# Patient Record
Sex: FEMALE | Race: OTHER | URBAN - METROPOLITAN AREA
[De-identification: names, ages, dates, MRNs, and addresses within clinical notes are randomized per-mention and may not be internally consistent; named-entity substitution may affect disease eponyms.]

---

## 2019-01-24 ENCOUNTER — APPOINTMENT (OUTPATIENT)
Dept: URBAN - METROPOLITAN AREA CLINIC 290 | Age: 44
Setting detail: DERMATOLOGY
End: 2019-01-25

## 2019-01-24 DIAGNOSIS — Z41.9 ENCOUNTER FOR PROCEDURE FOR PURPOSES OTHER THAN REMEDYING HEALTH STATE, UNSPECIFIED: ICD-10-CM

## 2019-01-24 PROCEDURE — OTHER COSMETIC CONSULTATION: COOLSCULPTING: OTHER

## 2019-01-24 PROCEDURE — OTHER COSMETIC CONSULTATION: CHEMICAL PEELS: OTHER

## 2019-01-24 ASSESSMENT — LOCATION DETAILED DESCRIPTION DERM
LOCATION DETAILED: LEFT LATERAL ABDOMEN
LOCATION DETAILED: LEFT INFERIOR CENTRAL MALAR CHEEK

## 2019-01-24 ASSESSMENT — LOCATION SIMPLE DESCRIPTION DERM
LOCATION SIMPLE: LEFT CHEEK
LOCATION SIMPLE: ABDOMEN

## 2019-01-24 ASSESSMENT — LOCATION ZONE DERM
LOCATION ZONE: FACE
LOCATION ZONE: TRUNK

## 2020-01-16 ENCOUNTER — APPOINTMENT (OUTPATIENT)
Dept: URBAN - METROPOLITAN AREA CLINIC 290 | Age: 45
Setting detail: DERMATOLOGY
End: 2020-01-16

## 2020-01-16 DIAGNOSIS — L72.8 OTHER FOLLICULAR CYSTS OF THE SKIN AND SUBCUTANEOUS TISSUE: ICD-10-CM

## 2020-01-16 DIAGNOSIS — Z41.9 ENCOUNTER FOR PROCEDURE FOR PURPOSES OTHER THAN REMEDYING HEALTH STATE, UNSPECIFIED: ICD-10-CM

## 2020-01-16 PROBLEM — D48.5 NEOPLASM OF UNCERTAIN BEHAVIOR OF SKIN: Status: ACTIVE | Noted: 2020-01-16

## 2020-01-16 PROCEDURE — 99202 OFFICE O/P NEW SF 15 MIN: CPT

## 2020-01-16 PROCEDURE — OTHER BOTOX (U OR CC): OTHER

## 2020-01-16 PROCEDURE — OTHER TREATMENT REGIMEN: OTHER

## 2020-01-16 PROCEDURE — OTHER COUNSELING: OTHER

## 2020-01-16 PROCEDURE — OTHER PRESCRIPTION: OTHER

## 2020-01-16 PROCEDURE — OTHER MIPS QUALITY: OTHER

## 2020-01-16 RX ORDER — CLINDAMYCIN PHOSPHATE 10 MG/ML
LOTION TOPICAL
Qty: 60 | Refills: 1 | Status: ERX | COMMUNITY
Start: 2020-01-16

## 2020-01-16 ASSESSMENT — LOCATION ZONE DERM: LOCATION ZONE: AXILLAE

## 2020-01-16 ASSESSMENT — LOCATION DETAILED DESCRIPTION DERM: LOCATION DETAILED: RIGHT AXILLARY VAULT

## 2020-01-16 ASSESSMENT — LOCATION SIMPLE DESCRIPTION DERM: LOCATION SIMPLE: RIGHT AXILLARY VAULT

## 2020-01-16 NOTE — PROCEDURE: BOTOX (U OR CC)
Additional Area 1 Units: 20
Additional Area 2 Units: 0
Consent: Verbal consent obtained. Risks include but not limited to lid/brow ptosis, bruising, swelling, diplopia, temporary effect, incomplete chemical denervation.
Dilution (U/0.1 Cc): 4
Price (Use Numbers Only, No Special Characters Or $): 200
Document As Units Or Cc?: units
Additional Area 1 Location: [place]
Post-Care Instructions: Patient instructed to not lie down for 4 hours and limit physical activity for 24 hours. Patient instructed not to travel by airplane for 48 hours.
Detail Level: Zone
Including Pricing Information In The Note: No

## 2020-02-26 ENCOUNTER — APPOINTMENT (OUTPATIENT)
Dept: URBAN - METROPOLITAN AREA CLINIC 290 | Age: 45
Setting detail: DERMATOLOGY
End: 2020-03-07

## 2020-02-26 DIAGNOSIS — Z41.9 ENCOUNTER FOR PROCEDURE FOR PURPOSES OTHER THAN REMEDYING HEALTH STATE, UNSPECIFIED: ICD-10-CM

## 2020-02-26 PROCEDURE — OTHER COSMETIC CONSULTATION: FILLERS: OTHER

## 2020-02-26 PROCEDURE — OTHER BOTOX (U OR CC): OTHER

## 2020-02-26 NOTE — PROCEDURE: BOTOX (U OR CC)
Additional Area 4 Location: [facility] Additional Area 4 Location: [facility]

## 2020-06-08 ENCOUNTER — APPOINTMENT (OUTPATIENT)
Dept: URBAN - METROPOLITAN AREA CLINIC 283 | Age: 45
Setting detail: DERMATOLOGY
End: 2020-06-08

## 2020-06-08 DIAGNOSIS — Z41.9 ENCOUNTER FOR PROCEDURE FOR PURPOSES OTHER THAN REMEDYING HEALTH STATE, UNSPECIFIED: ICD-10-CM

## 2020-06-08 PROCEDURE — OTHER BOTOX (U OR CC): OTHER

## 2020-06-08 NOTE — PROCEDURE: BOTOX (U OR CC)
Price (Use Numbers Only, No Special Characters Or $): 300
Periorbital Skin Units/Cc: 0
Including Pricing Information In The Note: No
Glabellar Complex Units: 30
Dilution (U/0.1 Cc): 4
Document As Units Or Cc?: units
Consent: Verbal consent obtained. Risks include but not limited to lid/brow ptosis, bruising, swelling, diplopia, temporary effect, incomplete chemical denervation.
Detail Level: Zone
Post-Care Instructions: Patient instructed to not lie down for 4 hours and limit physical activity for 24 hours. Patient instructed not to travel by airplane for 48 hours.

## 2021-02-24 ENCOUNTER — APPOINTMENT (OUTPATIENT)
Dept: URBAN - METROPOLITAN AREA CLINIC 231 | Age: 46
Setting detail: DERMATOLOGY
End: 2021-02-24

## 2021-02-24 DIAGNOSIS — Z41.9 ENCOUNTER FOR PROCEDURE FOR PURPOSES OTHER THAN REMEDYING HEALTH STATE, UNSPECIFIED: ICD-10-CM

## 2021-02-24 PROCEDURE — OTHER BOTOX (U OR CC): OTHER

## 2021-02-24 NOTE — PROCEDURE: BOTOX (U OR CC)
Price (Use Numbers Only, No Special Characters Or $): 932 Price (Use Numbers Only, No Special Characters Or $): 740

## 2021-05-28 ENCOUNTER — APPOINTMENT (OUTPATIENT)
Dept: URBAN - METROPOLITAN AREA CLINIC 231 | Age: 46
Setting detail: DERMATOLOGY
End: 2021-05-28

## 2021-05-28 DIAGNOSIS — Z41.9 ENCOUNTER FOR PROCEDURE FOR PURPOSES OTHER THAN REMEDYING HEALTH STATE, UNSPECIFIED: ICD-10-CM

## 2021-05-28 DIAGNOSIS — L82.0 INFLAMED SEBORRHEIC KERATOSIS: ICD-10-CM

## 2021-05-28 PROCEDURE — OTHER LIQUID NITROGEN: OTHER

## 2021-05-28 PROCEDURE — 17110 DESTRUCT B9 LESION 1-14: CPT

## 2021-05-28 PROCEDURE — OTHER BOTOX (U OR CC): OTHER

## 2021-05-28 PROCEDURE — OTHER COUNSELING: OTHER

## 2021-05-28 ASSESSMENT — LOCATION DETAILED DESCRIPTION DERM
LOCATION DETAILED: LEFT SUPERIOR FOREHEAD
LOCATION DETAILED: LEFT INFERIOR FOREHEAD
LOCATION DETAILED: LEFT LATERAL FOREHEAD
LOCATION DETAILED: RIGHT LATERAL FOREHEAD
LOCATION DETAILED: GLABELLA
LOCATION DETAILED: LEFT INFERIOR MEDIAL FOREHEAD
LOCATION DETAILED: RIGHT SUPERIOR FOREHEAD
LOCATION DETAILED: RIGHT INFERIOR MEDIAL FOREHEAD
LOCATION DETAILED: RIGHT SUPERIOR MEDIAL FOREHEAD
LOCATION DETAILED: LEFT MEDIAL FOREHEAD
LOCATION DETAILED: NASAL DORSUM

## 2021-05-28 ASSESSMENT — LOCATION SIMPLE DESCRIPTION DERM
LOCATION SIMPLE: LEFT FOREHEAD
LOCATION SIMPLE: RIGHT FOREHEAD
LOCATION SIMPLE: GLABELLA
LOCATION SIMPLE: NOSE

## 2021-05-28 ASSESSMENT — LOCATION ZONE DERM
LOCATION ZONE: FACE
LOCATION ZONE: NOSE

## 2021-05-28 NOTE — PROCEDURE: BOTOX (U OR CC)
Price (Use Numbers Only, No Special Characters Or $): 478 Price (Use Numbers Only, No Special Characters Or $): 441

## 2021-11-10 ENCOUNTER — APPOINTMENT (OUTPATIENT)
Dept: URBAN - METROPOLITAN AREA CLINIC 231 | Age: 46
Setting detail: DERMATOLOGY
End: 2021-11-10

## 2021-11-10 DIAGNOSIS — Z41.9 ENCOUNTER FOR PROCEDURE FOR PURPOSES OTHER THAN REMEDYING HEALTH STATE, UNSPECIFIED: ICD-10-CM

## 2021-11-10 PROCEDURE — OTHER ADDITIONAL NOTES: OTHER

## 2021-11-10 PROCEDURE — OTHER BOTOX (U OR CC): OTHER

## 2021-11-10 NOTE — PROCEDURE: BOTOX (U OR CC)
Price (Use Numbers Only, No Special Characters Or $): 278 Price (Use Numbers Only, No Special Characters Or $): 109

## 2021-11-10 NOTE — PROCEDURE: ADDITIONAL NOTES
Detail Level: Simple
Additional Notes: Discussed with patient that the forehead should be treated with a full syringe (20u)\\nRecommended to patient the treatment of Sculptra or Radiesse to help with acne scaring and collagen regrowth.
Render Risk Assessment In Note?: no

## 2022-02-22 ENCOUNTER — APPOINTMENT (OUTPATIENT)
Dept: URBAN - METROPOLITAN AREA CLINIC 231 | Age: 47
Setting detail: DERMATOLOGY
End: 2022-03-08

## 2022-02-22 DIAGNOSIS — Z41.9 ENCOUNTER FOR PROCEDURE FOR PURPOSES OTHER THAN REMEDYING HEALTH STATE, UNSPECIFIED: ICD-10-CM

## 2022-02-22 PROCEDURE — OTHER BOTOX (U OR CC): OTHER

## 2022-02-22 PROCEDURE — OTHER ADDITIONAL NOTES: OTHER

## 2022-02-22 NOTE — PROCEDURE: BOTOX (U OR CC)
[place] Units: 0
Additional Area 1 Location: [place]
Consent: Written consent obtained. Risks include but not limited to lid/brow ptosis, bruising, swelling, diplopia, temporary effect, incomplete chemical denervation.
Price (Use Numbers Only, No Special Characters Or $): 240
Including Pricing Information In The Note: No
Detail Level: Detailed
Post-Care Instructions: Patient instructed to not lie down for 4 hours and limit physical activity for 24 hours.
Document As Units Or Cc?: units
Additional Area 1 Units: 20
Dilution (U/0.1 Cc): 4

## 2022-02-22 NOTE — PROCEDURE: ADDITIONAL NOTES
Render Risk Assessment In Note?: no
Additional Notes: Discussed low eyebrows with patient. Recommended botox treatment in the upper forehead.
Detail Level: Simple
Additional Notes: Recommended elta Md tinted in the morning\\n5x retinol every other night working up to every night
Additional Notes: **QUOTE FILLER**\\nVersa February special \\n2 Versa \\n$825 \\nOr \\nSculptra- 2 vials \\n$1640

## 2022-03-09 ENCOUNTER — APPOINTMENT (OUTPATIENT)
Dept: URBAN - METROPOLITAN AREA CLINIC 231 | Age: 47
Setting detail: DERMATOLOGY
End: 2022-03-15

## 2022-03-09 DIAGNOSIS — Z41.9 ENCOUNTER FOR PROCEDURE FOR PURPOSES OTHER THAN REMEDYING HEALTH STATE, UNSPECIFIED: ICD-10-CM

## 2022-03-09 PROCEDURE — OTHER ADDITIONAL NOTES: OTHER

## 2022-03-09 PROCEDURE — OTHER FILLERS: OTHER

## 2022-03-09 NOTE — PROCEDURE: FILLERS
Price (Use Numbers Only, No Special Characters Or $): 930 Price (Use Numbers Only, No Special Characters Or $): 717

## 2022-03-09 NOTE — PROCEDURE: FILLERS
Consent: Written consent obtained. Risks include but not limited to bruising, beading, irregular texture, ulceration, infection, allergic reaction, scar formation, incomplete augmentation, temporary nature, procedural pain. Detail Level: Simple Instructions (Optional): SRT

## 2022-03-09 NOTE — PROCEDURE: ADDITIONAL NOTES
Additional Notes: Price per last visit note quote February special BYGO Versa 1/2 OFF
Detail Level: Simple
Additional Notes: Touch up of ultra plus per Dr. Fernandez no charge
Render Risk Assessment In Note?: no

## 2022-03-29 ENCOUNTER — APPOINTMENT (OUTPATIENT)
Dept: URBAN - METROPOLITAN AREA CLINIC 231 | Age: 47
Setting detail: DERMATOLOGY
End: 2022-04-12

## 2022-03-29 DIAGNOSIS — Z41.9 ENCOUNTER FOR PROCEDURE FOR PURPOSES OTHER THAN REMEDYING HEALTH STATE, UNSPECIFIED: ICD-10-CM

## 2022-03-29 PROCEDURE — OTHER ADDITIONAL NOTES: OTHER

## 2022-03-29 PROCEDURE — OTHER COSMETIC FOLLOW-UP: OTHER

## 2022-03-29 NOTE — PROCEDURE: COSMETIC FOLLOW-UP
Treatment (Optional): Filler Injection
Global Improvement: Very Good
Side Effects Or Complications: None
Detail Level: Zone
Patient Satisfaction: Very pleased

## 2022-03-29 NOTE — PROCEDURE: ADDITIONAL NOTES
Detail Level: Simple
Additional Notes: Dr. ogden and the patient are happy with results
Render Risk Assessment In Note?: no

## 2023-01-12 ENCOUNTER — APPOINTMENT (OUTPATIENT)
Dept: URBAN - METROPOLITAN AREA CLINIC 231 | Age: 48
Setting detail: DERMATOLOGY
End: 2023-01-19

## 2023-01-12 DIAGNOSIS — Z41.9 ENCOUNTER FOR PROCEDURE FOR PURPOSES OTHER THAN REMEDYING HEALTH STATE, UNSPECIFIED: ICD-10-CM

## 2023-01-12 PROCEDURE — OTHER BOTOX (U OR CC): OTHER

## 2023-01-12 ASSESSMENT — LOCATION DETAILED DESCRIPTION DERM
LOCATION DETAILED: LEFT INFERIOR MEDIAL FOREHEAD
LOCATION DETAILED: SUPERIOR MID FOREHEAD
LOCATION DETAILED: LEFT CENTRAL EYEBROW
LOCATION DETAILED: RIGHT INFERIOR MEDIAL FOREHEAD
LOCATION DETAILED: LEFT FOREHEAD
LOCATION DETAILED: LEFT LATERAL FOREHEAD
LOCATION DETAILED: RIGHT INFERIOR FOREHEAD
LOCATION DETAILED: GLABELLA
LOCATION DETAILED: RIGHT FOREHEAD
LOCATION DETAILED: RIGHT MEDIAL FOREHEAD
LOCATION DETAILED: RIGHT LATERAL FOREHEAD

## 2023-01-12 ASSESSMENT — LOCATION ZONE DERM: LOCATION ZONE: FACE

## 2023-01-12 ASSESSMENT — LOCATION SIMPLE DESCRIPTION DERM
LOCATION SIMPLE: RIGHT FOREHEAD
LOCATION SIMPLE: LEFT FOREHEAD
LOCATION SIMPLE: GLABELLA
LOCATION SIMPLE: SUPERIOR FOREHEAD
LOCATION SIMPLE: LEFT EYEBROW

## 2023-01-12 NOTE — PROCEDURE: BOTOX (U OR CC)
Price (Use Numbers Only, No Special Characters Or $): 559 Price (Use Numbers Only, No Special Characters Or $): 435